# Patient Record
Sex: FEMALE | Race: WHITE | NOT HISPANIC OR LATINO | Employment: FULL TIME | URBAN - METROPOLITAN AREA
[De-identification: names, ages, dates, MRNs, and addresses within clinical notes are randomized per-mention and may not be internally consistent; named-entity substitution may affect disease eponyms.]

---

## 2017-02-09 ENCOUNTER — OFFICE VISIT - HEALTHEAST (OUTPATIENT)
Dept: FAMILY MEDICINE | Facility: CLINIC | Age: 47
End: 2017-02-09

## 2017-02-09 DIAGNOSIS — A04.72 C. DIFFICILE COLITIS: ICD-10-CM

## 2017-02-09 DIAGNOSIS — E83.51 HYPOCALCEMIA: ICD-10-CM

## 2017-02-09 DIAGNOSIS — K51.00 PANCOLITIS (H): ICD-10-CM

## 2017-02-09 ASSESSMENT — MIFFLIN-ST. JEOR: SCORE: 1039.06

## 2017-02-13 ENCOUNTER — COMMUNICATION - HEALTHEAST (OUTPATIENT)
Dept: FAMILY MEDICINE | Facility: CLINIC | Age: 47
End: 2017-02-13

## 2017-02-16 ENCOUNTER — RECORDS - HEALTHEAST (OUTPATIENT)
Dept: ADMINISTRATIVE | Facility: OTHER | Age: 47
End: 2017-02-16

## 2017-04-04 ENCOUNTER — AMBULATORY - HEALTHEAST (OUTPATIENT)
Dept: FAMILY MEDICINE | Facility: CLINIC | Age: 47
End: 2017-04-04

## 2017-04-04 ENCOUNTER — OFFICE VISIT - HEALTHEAST (OUTPATIENT)
Dept: FAMILY MEDICINE | Facility: CLINIC | Age: 47
End: 2017-04-04

## 2017-04-04 DIAGNOSIS — L84 PLANTAR CALLUS: ICD-10-CM

## 2017-04-04 DIAGNOSIS — R21 RASH: ICD-10-CM

## 2017-04-04 ASSESSMENT — MIFFLIN-ST. JEOR: SCORE: 1025.46

## 2018-02-14 ENCOUNTER — OFFICE VISIT - HEALTHEAST (OUTPATIENT)
Dept: FAMILY MEDICINE | Facility: CLINIC | Age: 48
End: 2018-02-14

## 2018-02-14 DIAGNOSIS — Z12.31 VISIT FOR SCREENING MAMMOGRAM: ICD-10-CM

## 2018-02-14 DIAGNOSIS — K40.90 LEFT INGUINAL HERNIA: ICD-10-CM

## 2018-02-14 ASSESSMENT — MIFFLIN-ST. JEOR: SCORE: 1016.38

## 2018-02-15 ENCOUNTER — HOSPITAL ENCOUNTER (OUTPATIENT)
Dept: ULTRASOUND IMAGING | Facility: CLINIC | Age: 48
Discharge: HOME | End: 2018-02-15
Attending: FAMILY MEDICINE

## 2018-02-15 DIAGNOSIS — K40.90 LEFT INGUINAL HERNIA: ICD-10-CM

## 2018-02-16 ENCOUNTER — COMMUNICATION - HEALTHEAST (OUTPATIENT)
Dept: FAMILY MEDICINE | Facility: CLINIC | Age: 48
End: 2018-02-16

## 2018-02-16 DIAGNOSIS — K40.90 NON-RECURRENT UNILATERAL INGUINAL HERNIA WITHOUT OBSTRUCTION OR GANGRENE: ICD-10-CM

## 2018-02-23 ENCOUNTER — OFFICE VISIT - HEALTHEAST (OUTPATIENT)
Dept: SURGERY | Facility: CLINIC | Age: 48
End: 2018-02-23

## 2018-02-23 DIAGNOSIS — K40.90 NON-RECURRENT UNILATERAL INGUINAL HERNIA WITHOUT OBSTRUCTION OR GANGRENE: ICD-10-CM

## 2018-02-23 ASSESSMENT — MIFFLIN-ST. JEOR: SCORE: 995.07

## 2018-03-06 ASSESSMENT — MIFFLIN-ST. JEOR: SCORE: 991.44

## 2018-03-07 ENCOUNTER — ANESTHESIA - HEALTHEAST (OUTPATIENT)
Dept: SURGERY | Facility: HOSPITAL | Age: 48
End: 2018-03-07

## 2018-03-08 ENCOUNTER — SURGERY - HEALTHEAST (OUTPATIENT)
Dept: SURGERY | Facility: HOSPITAL | Age: 48
End: 2018-03-08

## 2018-03-16 ENCOUNTER — OFFICE VISIT - HEALTHEAST (OUTPATIENT)
Dept: SURGERY | Facility: CLINIC | Age: 48
End: 2018-03-16

## 2018-03-16 ENCOUNTER — COMMUNICATION - HEALTHEAST (OUTPATIENT)
Dept: SURGERY | Facility: CLINIC | Age: 48
End: 2018-03-16

## 2018-03-16 DIAGNOSIS — Z98.890 POST-OPERATIVE STATE: ICD-10-CM

## 2018-03-23 ENCOUNTER — COMMUNICATION - HEALTHEAST (OUTPATIENT)
Dept: SURGERY | Facility: CLINIC | Age: 48
End: 2018-03-23

## 2018-03-29 ENCOUNTER — OFFICE VISIT - HEALTHEAST (OUTPATIENT)
Dept: FAMILY MEDICINE | Facility: CLINIC | Age: 48
End: 2018-03-29

## 2018-03-29 ENCOUNTER — RECORDS - HEALTHEAST (OUTPATIENT)
Dept: GENERAL RADIOLOGY | Facility: CLINIC | Age: 48
End: 2018-03-29

## 2018-03-29 DIAGNOSIS — M79.641 PAIN IN RIGHT HAND: ICD-10-CM

## 2018-03-29 DIAGNOSIS — M79.641 HAND PAIN, RIGHT: ICD-10-CM

## 2018-04-02 ENCOUNTER — RECORDS - HEALTHEAST (OUTPATIENT)
Dept: ADMINISTRATIVE | Facility: OTHER | Age: 48
End: 2018-04-02

## 2018-04-02 ENCOUNTER — COMMUNICATION - HEALTHEAST (OUTPATIENT)
Dept: FAMILY MEDICINE | Facility: CLINIC | Age: 48
End: 2018-04-02

## 2018-04-02 DIAGNOSIS — M25.539 WRIST PAIN: ICD-10-CM

## 2018-04-17 ENCOUNTER — RECORDS - HEALTHEAST (OUTPATIENT)
Dept: ADMINISTRATIVE | Facility: OTHER | Age: 48
End: 2018-04-17

## 2018-06-05 ENCOUNTER — RECORDS - HEALTHEAST (OUTPATIENT)
Dept: ADMINISTRATIVE | Facility: OTHER | Age: 48
End: 2018-06-05

## 2018-08-21 ENCOUNTER — RECORDS - HEALTHEAST (OUTPATIENT)
Dept: ADMINISTRATIVE | Facility: OTHER | Age: 48
End: 2018-08-21

## 2019-01-07 ENCOUNTER — RECORDS - HEALTHEAST (OUTPATIENT)
Dept: ADMINISTRATIVE | Facility: OTHER | Age: 49
End: 2019-01-07

## 2020-07-27 ENCOUNTER — OFFICE VISIT - HEALTHEAST (OUTPATIENT)
Dept: FAMILY MEDICINE | Facility: CLINIC | Age: 50
End: 2020-07-27

## 2020-07-27 DIAGNOSIS — H93.8X3 SENSATION OF FULLNESS IN BOTH EARS: ICD-10-CM

## 2020-07-27 DIAGNOSIS — S09.90XD TRAUMATIC INJURY OF HEAD, SUBSEQUENT ENCOUNTER: ICD-10-CM

## 2020-07-27 DIAGNOSIS — K13.0 LIP LESION: ICD-10-CM

## 2020-07-27 DIAGNOSIS — Z12.31 VISIT FOR SCREENING MAMMOGRAM: ICD-10-CM

## 2020-09-01 ENCOUNTER — COMMUNICATION - HEALTHEAST (OUTPATIENT)
Dept: TELEHEALTH | Facility: CLINIC | Age: 50
End: 2020-09-01

## 2020-09-01 ENCOUNTER — OFFICE VISIT - HEALTHEAST (OUTPATIENT)
Dept: AUDIOLOGY | Facility: CLINIC | Age: 50
End: 2020-09-01

## 2020-09-01 ENCOUNTER — OFFICE VISIT - HEALTHEAST (OUTPATIENT)
Dept: OTOLARYNGOLOGY | Facility: CLINIC | Age: 50
End: 2020-09-01

## 2020-09-01 DIAGNOSIS — H93.8X2 SENSATION OF FULLNESS IN LEFT EAR: ICD-10-CM

## 2020-09-01 DIAGNOSIS — K13.0 LIP LESION: ICD-10-CM

## 2020-09-01 DIAGNOSIS — H90.3 SENSORINEURAL HEARING LOSS, BILATERAL: ICD-10-CM

## 2020-09-01 DIAGNOSIS — J34.89 NASAL VESTIBULITIS: ICD-10-CM

## 2020-09-01 RX ORDER — CLOBETASOL PROPIONATE 0.05 MG/G
GEL TOPICAL
Status: SHIPPED | COMMUNITY
Start: 2020-01-02 | End: 2021-09-29

## 2020-09-01 RX ORDER — FLUTICASONE PROPIONATE 0.5 MG/ML
LOTION TOPICAL
Status: SHIPPED | COMMUNITY
Start: 2020-09-01 | End: 2021-09-29

## 2020-09-01 RX ORDER — MOMETASONE FUROATE 1 MG/G
CREAM TOPICAL
Status: SHIPPED | COMMUNITY
Start: 2019-12-31 | End: 2021-09-29

## 2020-09-01 RX ORDER — BETAMETHASONE DIPROPIONATE 0.5 MG/G
CREAM TOPICAL 2 TIMES DAILY
Status: SHIPPED | COMMUNITY
Start: 2020-09-01 | End: 2023-03-06

## 2020-09-04 LAB
LAB AP CHARGES (HE HISTORICAL CONVERSION): NORMAL
PATH REPORT.COMMENTS IMP SPEC: NORMAL
PATH REPORT.COMMENTS IMP SPEC: NORMAL
PATH REPORT.FINAL DX SPEC: NORMAL
PATH REPORT.GROSS SPEC: NORMAL
PATH REPORT.MICROSCOPIC SPEC OTHER STN: NORMAL
PATH REPORT.RELEVANT HX SPEC: NORMAL
RESULT FLAG (HE HISTORICAL CONVERSION): NORMAL

## 2020-09-10 ENCOUNTER — COMMUNICATION - HEALTHEAST (OUTPATIENT)
Dept: OTOLARYNGOLOGY | Facility: CLINIC | Age: 50
End: 2020-09-10

## 2020-09-11 ENCOUNTER — COMMUNICATION - HEALTHEAST (OUTPATIENT)
Dept: OTOLARYNGOLOGY | Facility: CLINIC | Age: 50
End: 2020-09-11

## 2020-09-16 ENCOUNTER — COMMUNICATION - HEALTHEAST (OUTPATIENT)
Dept: OTOLARYNGOLOGY | Facility: CLINIC | Age: 50
End: 2020-09-16

## 2021-05-30 VITALS — BODY MASS INDEX: 21.6 KG/M2 | WEIGHT: 110 LBS | HEIGHT: 60 IN

## 2021-05-30 VITALS — WEIGHT: 113 LBS | HEIGHT: 60 IN | BODY MASS INDEX: 22.19 KG/M2

## 2021-06-01 VITALS — HEIGHT: 59 IN | BODY MASS INDEX: 21.53 KG/M2 | WEIGHT: 106.8 LBS

## 2021-06-01 VITALS — HEIGHT: 60 IN | BODY MASS INDEX: 21.2 KG/M2 | WEIGHT: 108 LBS

## 2021-06-01 VITALS — HEIGHT: 59 IN | BODY MASS INDEX: 21.37 KG/M2 | WEIGHT: 106 LBS

## 2021-06-01 VITALS — BODY MASS INDEX: 22.48 KG/M2 | WEIGHT: 111.31 LBS

## 2021-06-04 VITALS
TEMPERATURE: 98.2 F | BODY MASS INDEX: 21.64 KG/M2 | WEIGHT: 110.8 LBS | HEART RATE: 66 BPM | OXYGEN SATURATION: 98 % | SYSTOLIC BLOOD PRESSURE: 122 MMHG | DIASTOLIC BLOOD PRESSURE: 82 MMHG

## 2021-06-08 NOTE — PROGRESS NOTES
Jessica is a 46 y.o. female presenting to the clinic for follow-up on hospitalization.  Patient has a history of aplastic anemia in childhood requiring 2 bone marrow transplants.  She has a history of mitral valve prolapse which she states is minor and did not require repair.  Patient was admitted to the hospital on 12/13/16 with abdominal pain and diarrhea.  CT of the abdomen revealed pancolitis.  Stool culture was positive for Clostridium difficile.  Patient was initially started on Flagyl IV and oral vancomycin.  When she was discharged, she was experiencing 8 bowel movements per day.  Patient's white blood count was 9.7. Her calcium was 8.3.   Patient was taking fluids without difficulty but continued to have a poor appetite.  She is to complete a two-week course of vancomycin.  Patient states her bowel movements are now occurring once daily.  She denies any blood or mucus in the stool.  She has not had any abdominal pain.  She has been taking omeprazole for GERD.  No fever has been present.  She has been eating and drinking well.  She is taking a probiotic daily.    CT ABDOMEN PELVIS WO ORAL W IV CONTRAST  12/7/2016 10:56 PM      INDICATION: Abdominal pain with fever and leukocytosis  TECHNIQUE: CT abdomen and pelvis. Multiplanar reformation images (MPR). Dose reduction techniques were used.   IV CONTRAST: Iohexol (Omni) 100 mL  COMPARISON: None.     FINDINGS:  LUNG BASES: Negative.     ABDOMEN: Moderate diffuse colonic wall thickening with mucosal hyperenhancement compatible with pancolitis. Cyst or hemangioma caudate of the liver. Liver is otherwise negative. Spleen, pancreas, adrenal glands are normal. Small renal cysts.     PELVIS: Exophytic fundal uterine fibroid measuring 2.1 cm. Colonic wall thickening.     MUSCULOSKELETAL: Negative.     IMPRESSION:   CONCLUSION:  1. Changes of pancolitis with moderate diffuse colonic wall thickening and mucosal hyperenhancement. Minimal pericolonic soft tissue  haziness.     2. Bilateral renal cysts     3. Fibroid     4. Cyst or hemangioma base of the caudate liver.     NOTE: ABNORMAL REPORT     THE DICTATION ABOVE DESCRIBES AN ABNORMALITY FOR WHICH FOLLOW-UP IS NEEDED.     Review of Systems: A complete 14 point review of systems was obtained and is negative or as stated in the history of present illness.    Social History     Social History     Marital status:      Spouse name: N/A     Number of children: N/A     Years of education: N/A     Occupational History     Not on file.     Social History Main Topics     Smoking status: Never Smoker     Smokeless tobacco: Never Used     Alcohol use 3.6 oz/week     6 Cans of beer per week     Drug use: No     Sexual activity: Not Currently      Comment:      Other Topics Concern     Not on file     Social History Narrative       Active Ambulatory Problems     Diagnosis Date Noted     Pancolitis 12/07/2016     Immunosuppressed status      Diarrhea      Generalized abdominal pain      C. difficile colitis      Mitral valve prolapse 02/09/2017     Aplastic anemia 02/09/2017     Resolved Ambulatory Problems     Diagnosis Date Noted     No Resolved Ambulatory Problems     Past Medical History:   Diagnosis Date     Anemia        Family History   Problem Relation Age of Onset     Hypertension Mother      Hyperlipidemia Mother      Asthma Mother      Breast cancer Maternal Aunt      Lung cancer Maternal Uncle        OBJECTIVE:     Visit Vitals     /60 (Patient Site: Right Arm, Patient Position: Sitting, Cuff Size: Adult Regular)     Pulse 61     Temp 97.5  F (36.4  C)     Ht 5' (1.524 m)     Wt 113 lb (51.3 kg)     SpO2 97%     BMI 22.07 kg/m2       Patient is alert, in no obvious distress.   Skin: Warm, dry.  No lesions or rashes.  Skin turgor rapid return.   HEENT:  Head normocephalic, atraumatic.  Eyes normal.  Ears normal.  Nose patent, mucosa pink.  Oropharynx mucosa pink.  No lesions or tonsillar enlargement.    Neck: Supple, no lymphadenopathy.  Lungs:  Clear to auscultation. Respirations even and unlabored.  No wheezing or rales noted.   Heart:  Regular rate and rhythm.  No murmurs, S3, S4, gallops, or rubs.    Abdomen: Soft, nontender.  No organomegaly. Bowel sounds normoactive. No guarding or masses noted.       ASSESSMENT AND PLAN:     1. Pancolitis     2. C. difficile colitis     3. Hypocalcemia  Basic Metabolic Panel     Patient continues to take vancomycin.  She also plans on continuing the probiotic.  Bowel movements have improved and she is eating and drinking well.  We'll recheck BMP due to hypocalcemia.  We'll [facility].  She will follow up as needed.  Her medications were reconciled this visit.

## 2021-06-09 NOTE — PROGRESS NOTES
Jessica is a 46 y.o. female presenting to the clinic for concerns for possible warts present on her feet.  Patient states she noticed a wart present on her left foot fourth toe 2 months ago.  Patient states similar wart appeared on her right foot in the same area in her fourth toe one month ago.  Patient states it is painful when she wear shoes.  She denies any drainage from the area.  She has not noticed any redness or swelling.  She tried an over-the-counter wart remover with no relief.  Patient is also concerned of a rash present on her elbows and face.  She has had this intermittently for multiple years. She states it is occasionally pruritic.  It tends to flare with stressful events.  She has tried over-the-counter cortisone cream and moisturizing lotions.    Review of Systems: A complete 14 point review of systems was obtained and is negative or as stated in the history of present illness.    Social History     Social History     Marital status:      Spouse name: N/A     Number of children: N/A     Years of education: N/A     Occupational History     Not on file.     Social History Main Topics     Smoking status: Never Smoker     Smokeless tobacco: Never Used     Alcohol use 3.6 oz/week     6 Cans of beer per week     Drug use: No     Sexual activity: Not Currently      Comment:      Other Topics Concern     Not on file     Social History Narrative       Active Ambulatory Problems     Diagnosis Date Noted     Pancolitis 12/07/2016     Immunosuppressed status      Diarrhea      Generalized abdominal pain      C. difficile colitis      Mitral valve prolapse 02/09/2017     Aplastic anemia 02/09/2017     Resolved Ambulatory Problems     Diagnosis Date Noted     No Resolved Ambulatory Problems     Past Medical History:   Diagnosis Date     Anemia        Family History   Problem Relation Age of Onset     Hypertension Mother      Hyperlipidemia Mother      Asthma Mother      Breast cancer Maternal Aunt       Lung cancer Maternal Uncle        OBJECTIVE:     /68 (Patient Site: Left Arm, Patient Position: Sitting, Cuff Size: Adult Regular)  Pulse 67  Ht 5' (1.524 m)  Wt 110 lb (49.9 kg)  SpO2 97%  BMI 21.48 kg/m2    Patient is alert, in no obvious distress.   Skin: Warm, dry.  She has a dry scaly rash noted on both elbows.    Lungs:  Clear to auscultation. Respirations even and unlabored.  No wheezing or rales noted.   Heart:  Regular rate and rhythm.  No murmurs.   Musculoskeletal: She has full range of motion of both feet.  Her fourth and fifth toes appear to rub together and make contact at all times.  She has small calluses on the side of the fourth toe where the fifth toe is rubbing the area.    ASSESSMENT AND PLAN:     1. Rash  Differentials include psoriasis and eczema.  Will treat with triamcinolone cream as needed.  No longer than 2 weeks.  Will refer to dermatology for further evaluation.  - triamcinolone (KENALOG) 0.1 % cream; Apply to the affected area twice daily as needed  Dispense: 45 g; Refill: 2  - Ambulatory referral to Dermatology    2. Plantar callus   Recommend using a pumice to assist with the callus.  Did offer referral to podiatry for further evaluation, but she declines.  Discussed symptomatic treatment including placing a barrier between the 2 toes.  Patient will consider this and follow-up if symptoms persist or worsen.
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
## 2021-06-11 NOTE — TELEPHONE ENCOUNTER
----- Message from Fermin JOYCE MD sent at 9/10/2020 11:46 AM CDT -----  I left message on patient's answering service with the result of her biopsy. I advised that she call and let us know if she would like us to make a referral to dermatology.  ----- Message -----  From: Lab, Background User  Sent: 9/4/2020   4:04 PM CDT  To: Fermin JOYCE MD

## 2021-06-11 NOTE — TELEPHONE ENCOUNTER
Left patient a voicemail to call back regarding results.    Krista Munson RN  [facility]  [phone]

## 2021-06-11 NOTE — PROGRESS NOTES
HISTORY OF PRESENT ILLNESS  Asked to see by Jo-Ann Roth for evaluation lip lesion and hearing loss. Patient reports a lesion on the lower lip x 6 months. She feels that it is getting bigger. No bleeding or drainage. It is hard. Her entire bottom lip is red and rashy. It is very irritated. It peels at times. She has a sore in the left corner of the mouth. She also reports hearing loss. She reports history of noise exposure. She reports that her mother is deaf. Her sister is hard of hearing.     REVIEW OF SYSTEMS  Review of Systems: a 10-system review was performed. Pertinent positives are noted in the HPI and on a separate scanned document in the chart.    PMH, PSH, FH and SH has documented in the EHR.      EXAM    CONSTITUTIONAL  General Appearance:  Normal, well developed, well nourished, no obvious distress  Ability to Communicate:  communicates appropriately.    HEAD AND FACE  Appearance and Symmetry:  Normal, no scalp or facial scarring or suspicious lesions.    EARS  Clinical speech reception threshold:  Normal, able to hear normal speech.  Auricle:  Normal, Auricles without scars, lesions, masses.  External auditory canal:  Normal, External auditory canal normal.  Tympanic membrane:  Normal, Tympanic membranes normal without swelling or erythema.    NOSE (speculum or scope)  Architecture:  Normal, Grossly normal external nasal architecture with no masses or lesions.  Mucosa:  Normal mucosa, No polyps or masses.  Septum:  Crust left septum with erosion of mucosa.  Turbinates:  Normal, No turbinate abnormalities    ORAL CAVITY AND OROPHARYNX  Lips:  Small area of leukoplakia right lower lip.  Dental and gingiva:  Normal, No obvious dental or gingival disease.  Mucosa:  Normal, Moist mucous membranes.  Tongue:  Normal, Tongue mobile with no mucosal abnormalities  Hard and soft palate:  Normal, Hard and soft palate without cleft or mucosal lesions.  Oral pharynx:  Normal, Posterior pharynx without lesions or  remarkable asymmetry.  Saliva:  Normal, Clear saliva.  Masses:  Normal, No palpable masses or pathologically enlarged lymph nodes.    NECK  Masses/lymph nodes:  Normal, No worrisome neck masses or lymph nodes.  Salivary glands:  Normal, Parotid and submandibular glands.  Trachea and larynx position:  Normal, Trachea and larynx midline.  Thyroid:  Normal, No thyroid abnormality.  Tenderness:  Normal, No cervical tenderness.  Suppleness:  Normal, Neck supple    NEUROLOGICAL  Speech pattern:  Normal, Proasaic    RESPIRATORY  Symmetry and Respiratory effort:  Normal, Symmetric chest movement and expansion with no increased intercostal retractions or use of accessory muscles.     HEARING TEST  Results of hearing test as documented in audiology notes which were reviewed.    IMPRESSION  1. Lip lesion  2. Bilateral sensorineural hearing loss  3. Nasal vestibulitis    RECOMMENDATION  1. Discussed biopsy and the patient wanted to proceed. The area was infiltrated with lidocaine plus epinephrine solution. After allowing adequate time for anesthesia a 3mm punch biopsy was used to incision the area. The specimen was removed with scissors and placed in formalin.   2. Discussed hearing aid evaluation. Patient is going to think about her options.   3. Mupirocin to left nose x 2 weeks.    Fermin Salcedo MD

## 2021-06-11 NOTE — TELEPHONE ENCOUNTER
Left message on answering service regarding biopsy findings. I recommended referral to Dermatology. I asked that she call us to confirm receipt of the biopsy results and recommendation for referral.

## 2021-06-16 NOTE — ANESTHESIA CARE TRANSFER NOTE
Last vitals:   Vitals:    03/08/18 1100   BP: 143/78   Pulse: 90   Resp: 12   Temp: 36.7  C (98  F)   SpO2: 100%     Patient's level of consciousness is drowsy  Spontaneous respirations: yes  Maintains airway independently: yes  Dentition unchanged: yes  Oropharynx: oropharynx clear of all foreign objects    QCDR Measures:  ASA# 20 - Surgical Safety Checklist: WHO surgical safety checklist completed prior to induction  PQRS# 430 - Adult PONV Prevention: 4558F - Pt received => 2 anti-emetic agents (different classes) preop & intraop  ASA# 8 - Peds PONV Prevention: NA - Not pediatric patient, not GA or 2 or more risk factors NOT present  PQRS# 424 - Isabela-op Temp Management: 4559F - At least one body temp DOCUMENTED => 35.5C or 95.9F within required timeframe  PQRS# 426 - PACU Transfer Protocol: - Transfer of care checklist used  ASA# 14 - Acute Post-op Pain: ASA14B - Patient did NOT experience pain >= 7 out of 10

## 2021-06-16 NOTE — PROGRESS NOTES
Assessment/Plan:     Patient presents with a small bulge in the left inguinal area and significant pain, worsened with standing and moving as well as lifting.  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  Ultrasound ordered to evaluate presence of hernia.  If it is present, will refer to general surgery for evaluation of surgical resolution.  If it is not a hernia, discussed with patient that the differential is fairly broad or that she could have hernia but it might not correlate to her pain symptoms.  If patient has any worsening symptoms she will present immediately to the emergency department such as signs of infection or worsening pain.    Problem List Items Addressed This Visit     None      Visit Diagnoses     Left inguinal hernia    -  Primary    Relevant Orders    US Groin Non Vascular Left    Visit for screening mammogram                Total time spent with patient was 15 minutes with greater than 50% spent in face-to-face counseling regarding the above plan.    This note has been dictated using voice recognition software. Any grammatical or context distortions are unintentional and inherent to the the software.     Talia Hayward MD  [facility]      Subjective:      Jessica Galvan is a 47 y.o. female who presents to clinic for possible hernia.    Location/radiation: left lower quadrant, left pelvis  Quality: cramping, feels tight  Severity: 10/10 at its worst, 5/10 now  Length of time since symptoms began: October or November, 4-5 months ago  Frequency, episode duration, regularity: flares last one half day or a few hours or even a day; today's episode has been going for over a day; 10 flares in the past 5 months  Triggers or situations in which it occurs: worse with moving around and standing, worsened with stretching for things above her head  Modifying factors: feels better when she leans forward, has  not taken any medication for it previously  Associated signs and sx: no changes in bowel habits but is intermittently constipated    Patient states they have never experienced this type of pain before. Patient has had c diff before and states her 'stomach has never felt the same' after that. She also fell in September and thinks this pain might be related.      Past Medical History, Family History, and Social History reviewed.     Review of systems is as stated in HPI.  Patient endorses: weakness, fatigue, dizziness, nausea, joint pain, joint swelling, easy bruising  The remainder of the 10 system review is otherwise negative.    Objective:     /84  Pulse 84  Ht 5' (1.524 m)  Wt 108 lb (49 kg)  SpO2 99%  BMI 21.09 kg/m2 Body mass index is 21.09 kg/(m^2).    Gen: Alert, NAD, appears stated age, normal hygiene   ABD: non-tender to palpation, nondistended  MSK: grossly full range of motion in all joints, no obvious deformity; 1 cm mass palpated easily in the left inguinal area which worsened slightly with Valsalva, no tenderness in the suprapubic area      Current Outpatient Prescriptions on File Prior to Visit   Medication Sig Dispense Refill     ascorbic acid, vitamin C, (ASCORBIC ACID WITH KUSHAL HIPS) 500 MG tablet Take 1,000 mg by mouth daily.       cholecalciferol, vitamin D3, 1,000 unit tablet Take 2,000 Units by mouth daily.       Lactobacillus rhamnosus GG (CULTURELLE) 10-15 Billion cell capsule Take 1 capsule by mouth 3 (three) times a day with meals. 60 capsule 0     multivitamin therapeutic (THERAGRAN) tablet Take 1 tablet by mouth daily.       omeprazole (PRILOSEC) 20 MG capsule Take 20 mg by mouth every other day.       potassium gluconate 595 mg (99 mg) Tab Take 1 tablet by mouth daily.       pyridoxine, vitamin B6, (B-6) 50 MG tablet Take 100 mg by mouth daily.       triamcinolone (KENALOG) 0.1 % cream Apply to the affected area twice daily as needed 45 g 2     [DISCONTINUED] vancomycin  (VANCOCIN) 125 mg/2.5 mL Syrg solution Take 2.5 mL (125 mg total) by mouth 4 (four) times a day. 140 mL 0     No current facility-administered medications on file prior to visit.

## 2021-06-16 NOTE — ANESTHESIA POSTPROCEDURE EVALUATION
Patient: Jessica Galvan  ROBOTIC LEFT INGUINAL HERNIA REPAIR  Anesthesia type: general    Patient location: PACU  Last vitals:   Vitals:    03/08/18 1400   BP: 108/55   Pulse: (!) 47   Resp: 16   Temp:    SpO2: 99%     Post vital signs: stable  Level of consciousness: awake and responds to simple questions  Post-anesthesia pain: pain controlled  Post-anesthesia nausea and vomiting: no  Pulmonary: unassisted, return to baseline  Cardiovascular: stable and blood pressure at baseline  Hydration: adequate  Anesthetic events: no    QCDR Measures:  ASA# 11 - Isabela-op Cardiac Arrest: ASA11B - Patient did NOT experience unanticipated cardiac arrest  ASA# 12 - Isabela-op Mortality Rate: ASA12B - Patient did NOT die  ASA# 13 - PACU Re-Intubation Rate: ASA13B - Patient did NOT require a new airway mgmt  ASA# 10 - Composite Anes Safety: ASA10A - No serious adverse event    Additional Notes:

## 2021-06-16 NOTE — PROGRESS NOTES
Small left inguinal bulge worse with heavy lifting, tender to palpation, palpable bulge plan for robotic left possible right    HPI:  Jessica Galvan is a 47 y.o. female who was referred to me by No Primary Care Provider for an inguinal hernia. She  presents today with complaints of a left inguinal bulge.  She notices this is worse with heavy lifting and states the bulge is progressively enlarging.  He denies any right-sided bulges but would like to address her left inguinal region.  She did have an ultrasound of left inguinal region recently which demonstrated a fat-containing inguinal hernia.    Allergies:Prozac [fluoxetine] and Norflex [orphenadrine citrate]    Past Medical History:   Diagnosis Date     Anemia        Past Surgical History:   Procedure Laterality Date     APPENDECTOMY       BONE MARROW TRANSPLANT         CURRENT MEDS:  Current Outpatient Prescriptions   Medication Sig Dispense Refill     ascorbic acid, vitamin C, (ASCORBIC ACID WITH KUSHAL HIPS) 500 MG tablet Take 1,000 mg by mouth daily.       cholecalciferol, vitamin D3, 1,000 unit tablet Take 2,000 Units by mouth daily.       Lactobacillus rhamnosus GG (CULTURELLE) 10-15 Billion cell capsule Take 1 capsule by mouth 3 (three) times a day with meals. 60 capsule 0     multivitamin therapeutic (THERAGRAN) tablet Take 1 tablet by mouth daily.       omeprazole (PRILOSEC) 20 MG capsule Take 20 mg by mouth every other day.       potassium gluconate 595 mg (99 mg) Tab Take 1 tablet by mouth daily.       pyridoxine, vitamin B6, (B-6) 50 MG tablet Take 100 mg by mouth daily.       triamcinolone (KENALOG) 0.1 % cream Apply to the affected area twice daily as needed 45 g 2     No current facility-administered medications for this visit.        Family History   Problem Relation Age of Onset     Hypertension Mother      Hyperlipidemia Mother      Asthma Mother      Breast cancer Maternal Aunt      Lung cancer Maternal Uncle         reports that she has never  "smoked. She has never used smokeless tobacco. She reports that she drinks about 3.6 oz of alcohol per week  She reports that she does not use illicit drugs.    Review of Systems -   The 12 system review is within normal limits except for as mentioned above.  General ROS: No complaints or constitutional symptoms  Ophthalmic ROS: No complaints of visual changes  Skin: No complaints or symptoms   Endocrine: No complaints or symptoms  Hematologic/Lymphatic: No symptoms or complaints  Psychiatric: No symptoms or complaints  Respiratory ROS: no cough, shortness of breath, or wheezing  Cardiovascular ROS: no chest pain or dyspnea on exertion  Gastrointestinal ROS: As per HPI  Genito-Urinary ROS: no dysuria, trouble voiding, or hematuria  Musculoskeletal ROS: no joint or muscle pain  Neurological ROS: no TIA or stroke symptoms    BP (!) 143/93 (Patient Site: Right Arm, Patient Position: Sitting, Cuff Size: Adult Regular)  Pulse 67  Ht 4' 11\" (1.499 m)  Wt 106 lb 12.8 oz (48.4 kg)  SpO2 97%  Breastfeeding? No  BMI 21.57 kg/m2  Body mass index is 21.57 kg/(m^2).    EXAM:  GENERAL: Well developed female, No acute distress, pleasant and conversant   EYES: Pupils equal, round and reactive, no scleral icterus  CARDIAC: Regular rate and rhythm, no obvious murmurs noted  CHEST/LUNG: Clear to ascultation bilaterally, No ronchi, No wheezes  ABDOMEN: Soft, nontender, palpable left inguinal bulge with Valsalva and cough  SKIN: Pink, warm and dry, no obvious rashes or lesions   NEURO:No focal deficits, ambulatory  MUSCULOSKELETAL:No obvious deformities, no swelling, normal appearing          IMAGES:   Relevant images were reviewed and discussed with the patient.  Notable findings were   US GROIN NON VASCULAR LEFT  2/15/2018 4:21 PM     INDICATION: hernia?  COMPARISON: CT abdomen and pelvis 12/07/2016.     FINDINGS: Targeted grayscale and Doppler ultrasound evaluation was performed of the left inguinal region at the site of " palpable concern. With Valsalva maneuver, a small amount of tissue with echotexture similar to subcutaneous fat extends through the   left inguinal canal. The findings are consistent with a small fat-containing hernia.     IMPRESSION:   CONCLUSION:   Findings consistent with a small fat-containing left inguinal hernia. This could be confirmed by noncontrast CT as necessary.      Assessment/Plan:   Jessica Galvan is a 47 y.o. female with a left inguinal hernia.  I have discussed the pathophysiology of inguinal hernias at length as well as the  surgical and non-operative management strategies.      The risks of Robotic, Laparoscopic and open inguinal hernia  surgery were explained in detail which include, but are not limited to, bleeding, infection of the mesh, recurrence of the hernia, chronic pain, poor cosmesis, the need for reoperative intervention, the possibility of conversion from a laparoscopic approach to an open approach, subcutaneous emphysema, injury to vital structures,  blood clots, heart attack, stroke and death.  Additionally, the risks of observation were also discussed in detail which include, but are not limited to, chronic pain, enlargement of the hernia, incarceration, strangulation and death.      She understands everything that was discussed and has consented to proceed with surgery.   We will plan on scheduling a robotic left and possible right inguinal hernia repair at her desired date.       Bro Nazario D.O. FACS  [phone]  [facility] Department of Surgery

## 2021-06-16 NOTE — PROGRESS NOTES
"HPI: Pt is here for follow up of a robotic left inguinal hernia repair.   She was initially doing well the first 3 days post op but then she feels that she \"over did it' at the grocery store and carrying to much up to her house and has persistent pain since then.  She has not tried tylenol/ibuprofen of ice to help relieve the pain.     She is eating and having regular bowel movement.   No difficulties with the surgical incisions.  Denies fever/chills.       BP (!) 163/96 (Patient Site: Right Arm, Patient Position: Sitting, Cuff Size: Adult Regular)  Pulse 70  SpO2 99%  Breastfeeding? No    EXAM:  GENERAL:Appears well, emotional as she just heard of her uncle passing away  ABDOMEN:  Soft, +BS, some ecchymosis in LLQ, incisions well healed, slight ttp left groin - no bulge or mass noted.  SURGICAL WOUNDS:  Incisions healing well, no enduration or drainage.      Assessment/Plan: .     Patient having some prolonged pain likely related to increasing her activity and lifting too much so soon post op.  I discussed the case with Dr. Nazario and we recommended tylenol, ibuprofen and ice over the next 1-2 weeks and expect that her pain will improve.    If her pain worsens or continues to persist she was instructed to come back in for an appointment with Dr. Nazario.       Hesham Holder, [facility] Department of Surgery    "

## 2021-06-16 NOTE — ANESTHESIA PREPROCEDURE EVALUATION
Anesthesia Evaluation      Patient summary reviewed     Airway   Mallampati: II  Neck ROM: full   Pulmonary - negative ROS and normal exam                          Cardiovascular - normal exam  (+) valvular problems/murmurs MVP, ,      Neuro/Psych - negative ROS     Endo/Other - negative ROS      GI/Hepatic/Renal - negative ROS     Comments: Hx C. 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

## 2021-06-17 NOTE — PROGRESS NOTES
ASSESSMENT/PLAN:    Hand pain, right  47 y.o. Female presented with 3 weeks of pain along the base of her thumb (metacarpal-carpal).  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  Xray was unremarkable.  I advised rest but she is certainly she cannot take any more time off from work due to being back from sick leave for hernia surgery.  I advised wearing a wrist brace with a thumb splint and keep her hands elevated above her waist.  She may take OTC analgesics for pain management.  F/u with PCP in 2-4 wks if still without improvement  -     XR Hand Right 3 or More VWS; Future; Expected date: 3/29/18    Orders Placed This Encounter   Procedures     XR Hand Right 3 or More VWS     Standing Status:   Future     Number of Occurrences:   1     Standing Expiration Date:   3/29/2019     Order Specific Question:   Is the patient pregnant?     Answer:   No     Order Specific Question:   Can the procedure be changed per Radiologist protocol?     Answer:   Yes         CHIEF COMPLAINT:  Chief Complaint   Patient presents with     Hand Pain     right hand pain/swollen x weeks, no injury related, thinks it is from work     FYI     had surgery repair x 3 weeks ago       HISTORY OF PRESENT ILLNESS:  Jessica is a 47 y.o. female presenting to the clinic today for hand pain. She has had pain in her right hand and thumb for a few weeks. She does note that 3 weeks ago, she had an abdominal hernia repair. The hand started to have pain during the week of surgery. The right thumb is swelling for her. She feels most pain at the CMC join of the right thumb. She has pain with certain range of motion, and with pinching things. She can have some pain to the touch. She went back to work 3 days ago post surgery, and this is when her hand started to get worse. She is left hand dominant. She does a lot of repetitive hand use at work. She rates the  pain at a 7-8/10, and was a 10/10 at work. She does note that she jammed her right hand at work today. She has been taking ibuprofen 600mg 1-2 times daily for her pain.     REVIEW OF SYSTEMS:   Constitutional: Negative.   HENT: Negative.   Eyes: Negative.   Respiratory: Negative.   Cardiovascular: Negative.   Gastrointestinal: Negative.   Endocrine: Negative.   Genitourinary: Negative.   Skin: Negative.   Allergic/Immunologic: Negative.   Neurological: Negative.   Hematological: Negative.   Psychiatric/Behavioral: Negative.   All other systems are negative.    PFSH:  Not a smoker.   Family history of osteoporosis and osteoarthritis.      TOBACCO USE:  History   Smoking Status     Never Smoker   Smokeless Tobacco     Never Used       VITALS:  Vitals:    03/29/18 1609   BP: 132/80   Pulse: 76   Weight: 111 lb 5 oz (50.5 kg)     Wt Readings from Last 3 Encounters:   03/29/18 111 lb 5 oz (50.5 kg)   03/06/18 106 lb (48.1 kg)   02/23/18 106 lb 12.8 oz (48.4 kg)       PHYSICAL EXAM:  Constitutional: Patient is oriented to person, place, and time. Patient appears well-developed and well-nourished. No distress.   Neurological: Patient is alert and oriented to person, place, and time.   Skin: Skin is warm and dry. No rash noted. Patient is not diaphoretic. No erythema. No pallor.  Musculoskeletal: There is some mild swelling, no erythema. There is tenderness to palpation to the right CMC joint. Radial pulse is strong.     Results for orders placed or performed during the hospital encounter of 03/08/18   Pregnancy, urine   Result Value Ref Range    Pregnancy Test, Urine Negative Negative    Specific Gravity, UA 1.019 1.001 - 1.030   Hemogram 2   Result Value Ref Range    WBC 5.8 4.0 - 11.0 thou/uL    RBC 3.64 (L) 3.80 - 5.40 mill/uL    Hemoglobin 12.5 12.0 - 16.0 g/dL    Hematocrit 37.6 35.0 - 47.0 %     (H) 80 - 100 fL    MCH 34.3 (H) 27.0 - 34.0 pg    MCHC 33.2 32.0 - 36.0 g/dL    RDW 12.6 11.0 - 14.5 %    Platelets  189 140 - 440 thou/uL    MPV 9.8 8.5 - 12.5 fL           ADDITIONAL HISTORY SUMMARIZED (2): None.  DECISION TO OBTAIN EXTRA INFORMATION (1): None.   RADIOLOGY TESTS (1): Ordered hand XR today.  LABS (1): None.  MEDICINE TESTS (1): None.  INDEPENDENT REVIEW (2 each): reviewed and interpreted xray.     ITaina, am scribing for and in the presence of, Dr. Stinson.    Maureen IVAN MD , personally performed the services described in this documentation, as scribed by Taina Jon in my presence, and it is both accurate and complete.    MEDICATIONS:  Current Outpatient Prescriptions   Medication Sig Dispense Refill     ascorbic acid, vitamin C, (ASCORBIC ACID WITH KUSHAL HIPS) 500 MG tablet Take 1,000 mg by mouth daily.       cholecalciferol, vitamin D3, 1,000 unit tablet Take 2,000 Units by mouth daily.       Lactobacillus rhamnosus GG (CULTURELLE) 10-15 Billion cell capsule Take 1 capsule by mouth daily.       lifitegrast (XIIDRA) 5 % Dpet Apply 1 drop to eye at bedtime.       multivitamin therapeutic (THERAGRAN) tablet Take 1 tablet by mouth daily.       omeprazole (PRILOSEC) 20 MG capsule Take 20 mg by mouth every other day.       polyvinyl alcohol (LIQUIFILM TEARS) 1.4 % ophthalmic solution Apply 1 drop to eye as needed for dry eyes.       potassium gluconate 595 mg (99 mg) Tab Take 1 tablet by mouth daily.       pyridoxine, vitamin B6, (B-6) 50 MG tablet Take 100 mg by mouth daily.       tacrolimus (PROTOPIC) 0.1 % ointment   4     No current facility-administered medications for this visit.        Total data points: 3

## 2021-06-29 NOTE — PROGRESS NOTES
"Progress Notes by Giselle Pandya AuD at 9/1/2020 12:40 PM     Author: Giselle Pandya AuD Service: -- Author Type: Audiologist    Filed: 9/1/2020  1:21 PM Encounter Date: 9/1/2020 Status: Signed    : Giselle Pandya AuD (Audiologist)       Hearing evaluation in conjunction with ENT exam (Dr. Salcedo)    Summary:  Audiology visit completed. Please see audiogram below or under \"media\" tab for history and results.    Transducer:  Both insert phones and circumaural headphones were used.    Reliability:    Good    Recommendations:  Follow-up with ENT; retest hearing annually (to monitor) or per medical management/patient concern.  Wear hearing protection consistently in noise to preserve residual hearing sensitivity.  Jessica Galvan is a potential binaural amplification candidate, if patient motivation exists and medical clearance is granted.    Ling Berger, [facility]  [place] Licensed Audiologist [id number]           "

## 2021-07-11 ENCOUNTER — HEALTH MAINTENANCE LETTER (OUTPATIENT)
Age: 51
End: 2021-07-11

## 2021-09-05 ENCOUNTER — HEALTH MAINTENANCE LETTER (OUTPATIENT)
Age: 51
End: 2021-09-05

## 2021-09-27 PROBLEM — D84.9 IMMUNOSUPPRESSED STATUS (H): Status: ACTIVE | Noted: 2021-09-27

## 2021-09-27 PROBLEM — R10.84 GENERALIZED ABDOMINAL PAIN: Status: ACTIVE | Noted: 2021-09-27

## 2021-09-27 PROBLEM — K40.90 NON-RECURRENT UNILATERAL INGUINAL HERNIA WITHOUT OBSTRUCTION OR GANGRENE: Status: ACTIVE | Noted: 2021-09-27

## 2021-09-27 PROBLEM — I34.1 MITRAL VALVE PROLAPSE: Status: ACTIVE | Noted: 2017-02-09

## 2021-09-27 PROBLEM — A04.72 C. DIFFICILE COLITIS: Status: ACTIVE | Noted: 2021-09-27

## 2021-09-27 PROBLEM — R19.7 DIARRHEA: Status: ACTIVE | Noted: 2021-09-27

## 2021-09-29 ENCOUNTER — OFFICE VISIT (OUTPATIENT)
Dept: FAMILY MEDICINE | Facility: CLINIC | Age: 51
End: 2021-09-29
Payer: COMMERCIAL

## 2021-09-29 VITALS
HEART RATE: 77 BPM | WEIGHT: 106.5 LBS | DIASTOLIC BLOOD PRESSURE: 82 MMHG | OXYGEN SATURATION: 100 % | BODY MASS INDEX: 21.47 KG/M2 | HEIGHT: 59 IN | TEMPERATURE: 97.1 F | SYSTOLIC BLOOD PRESSURE: 120 MMHG

## 2021-09-29 DIAGNOSIS — N95.2 VAGINAL ATROPHY: ICD-10-CM

## 2021-09-29 DIAGNOSIS — I34.1 MITRAL VALVE PROLAPSE: ICD-10-CM

## 2021-09-29 DIAGNOSIS — Z12.31 ENCOUNTER FOR SCREENING MAMMOGRAM FOR BREAST CANCER: ICD-10-CM

## 2021-09-29 DIAGNOSIS — E83.42 HYPOMAGNESEMIA: ICD-10-CM

## 2021-09-29 DIAGNOSIS — Z11.3 ROUTINE SCREENING FOR STI (SEXUALLY TRANSMITTED INFECTION): ICD-10-CM

## 2021-09-29 DIAGNOSIS — K21.9 GASTROESOPHAGEAL REFLUX DISEASE WITHOUT ESOPHAGITIS: ICD-10-CM

## 2021-09-29 DIAGNOSIS — Z78.0 MENOPAUSE: ICD-10-CM

## 2021-09-29 DIAGNOSIS — D61.9 APLASTIC ANEMIA (H): ICD-10-CM

## 2021-09-29 DIAGNOSIS — Z11.4 SCREENING FOR HIV (HUMAN IMMUNODEFICIENCY VIRUS): ICD-10-CM

## 2021-09-29 DIAGNOSIS — K43.9 VENTRAL HERNIA WITHOUT OBSTRUCTION OR GANGRENE: ICD-10-CM

## 2021-09-29 DIAGNOSIS — Z11.59 ENCOUNTER FOR HEPATITIS C SCREENING TEST FOR LOW RISK PATIENT: ICD-10-CM

## 2021-09-29 DIAGNOSIS — Z12.4 SCREENING FOR CERVICAL CANCER: ICD-10-CM

## 2021-09-29 DIAGNOSIS — A04.72 C. DIFFICILE COLITIS: Primary | ICD-10-CM

## 2021-09-29 DIAGNOSIS — L20.82 FLEXURAL ECZEMA: ICD-10-CM

## 2021-09-29 DIAGNOSIS — Z00.00 ANNUAL PHYSICAL EXAM: ICD-10-CM

## 2021-09-29 PROBLEM — D84.9 IMMUNOSUPPRESSED STATUS (H): Status: RESOLVED | Noted: 2021-09-27 | Resolved: 2021-09-29

## 2021-09-29 PROBLEM — R10.84 GENERALIZED ABDOMINAL PAIN: Status: RESOLVED | Noted: 2021-09-27 | Resolved: 2021-09-29

## 2021-09-29 PROBLEM — R19.7 DIARRHEA: Status: RESOLVED | Noted: 2021-09-27 | Resolved: 2021-09-29

## 2021-09-29 LAB
ALBUMIN SERPL-MCNC: 4.4 G/DL (ref 3.5–5)
ALP SERPL-CCNC: 109 U/L (ref 45–120)
ALT SERPL W P-5'-P-CCNC: 21 U/L (ref 0–45)
ANION GAP SERPL CALCULATED.3IONS-SCNC: 15 MMOL/L (ref 5–18)
AST SERPL W P-5'-P-CCNC: 25 U/L (ref 0–40)
BILIRUB SERPL-MCNC: 0.3 MG/DL (ref 0–1)
BUN SERPL-MCNC: 11 MG/DL (ref 8–22)
CALCIUM SERPL-MCNC: 10 MG/DL (ref 8.5–10.5)
CHLORIDE BLD-SCNC: 103 MMOL/L (ref 98–107)
CHOLEST SERPL-MCNC: 221 MG/DL
CO2 SERPL-SCNC: 23 MMOL/L (ref 22–31)
CREAT SERPL-MCNC: 0.64 MG/DL (ref 0.6–1.1)
ERYTHROCYTE [DISTWIDTH] IN BLOOD BY AUTOMATED COUNT: 12.5 % (ref 10–15)
FASTING STATUS PATIENT QL REPORTED: ABNORMAL
GFR SERPL CREATININE-BSD FRML MDRD: >90 ML/MIN/1.73M2
GLUCOSE BLD-MCNC: 84 MG/DL (ref 70–125)
HCT VFR BLD AUTO: 40.7 % (ref 35–47)
HDLC SERPL-MCNC: 99 MG/DL
HGB BLD-MCNC: 13.9 G/DL (ref 11.7–15.7)
HIV 1+2 AB+HIV1 P24 AG SERPL QL IA: NEGATIVE
LDLC SERPL CALC-MCNC: 114 MG/DL
MAGNESIUM SERPL-MCNC: 2.1 MG/DL (ref 1.8–2.6)
MCH RBC QN AUTO: 34.7 PG (ref 26.5–33)
MCHC RBC AUTO-ENTMCNC: 34.2 G/DL (ref 31.5–36.5)
MCV RBC AUTO: 102 FL (ref 78–100)
PLATELET # BLD AUTO: 255 10E3/UL (ref 150–450)
POTASSIUM BLD-SCNC: 4.3 MMOL/L (ref 3.5–5)
PROT SERPL-MCNC: 7.4 G/DL (ref 6–8)
RBC # BLD AUTO: 4.01 10E6/UL (ref 3.8–5.2)
SODIUM SERPL-SCNC: 141 MMOL/L (ref 136–145)
TRIGL SERPL-MCNC: 41 MG/DL
WBC # BLD AUTO: 5.7 10E3/UL (ref 4–11)

## 2021-09-29 PROCEDURE — 80053 COMPREHEN METABOLIC PANEL: CPT | Performed by: STUDENT IN AN ORGANIZED HEALTH CARE EDUCATION/TRAINING PROGRAM

## 2021-09-29 PROCEDURE — 87591 N.GONORRHOEAE DNA AMP PROB: CPT | Performed by: STUDENT IN AN ORGANIZED HEALTH CARE EDUCATION/TRAINING PROGRAM

## 2021-09-29 PROCEDURE — 36415 COLL VENOUS BLD VENIPUNCTURE: CPT | Performed by: STUDENT IN AN ORGANIZED HEALTH CARE EDUCATION/TRAINING PROGRAM

## 2021-09-29 PROCEDURE — 86780 TREPONEMA PALLIDUM: CPT | Performed by: STUDENT IN AN ORGANIZED HEALTH CARE EDUCATION/TRAINING PROGRAM

## 2021-09-29 PROCEDURE — 86803 HEPATITIS C AB TEST: CPT | Performed by: STUDENT IN AN ORGANIZED HEALTH CARE EDUCATION/TRAINING PROGRAM

## 2021-09-29 PROCEDURE — 85027 COMPLETE CBC AUTOMATED: CPT | Performed by: STUDENT IN AN ORGANIZED HEALTH CARE EDUCATION/TRAINING PROGRAM

## 2021-09-29 PROCEDURE — 87389 HIV-1 AG W/HIV-1&-2 AB AG IA: CPT | Performed by: STUDENT IN AN ORGANIZED HEALTH CARE EDUCATION/TRAINING PROGRAM

## 2021-09-29 PROCEDURE — 87491 CHLMYD TRACH DNA AMP PROBE: CPT | Performed by: STUDENT IN AN ORGANIZED HEALTH CARE EDUCATION/TRAINING PROGRAM

## 2021-09-29 PROCEDURE — 80061 LIPID PANEL: CPT | Performed by: STUDENT IN AN ORGANIZED HEALTH CARE EDUCATION/TRAINING PROGRAM

## 2021-09-29 PROCEDURE — 99396 PREV VISIT EST AGE 40-64: CPT | Performed by: STUDENT IN AN ORGANIZED HEALTH CARE EDUCATION/TRAINING PROGRAM

## 2021-09-29 PROCEDURE — 88142 CYTOPATH C/V THIN LAYER: CPT | Performed by: STUDENT IN AN ORGANIZED HEALTH CARE EDUCATION/TRAINING PROGRAM

## 2021-09-29 PROCEDURE — 87624 HPV HI-RISK TYP POOLED RSLT: CPT | Performed by: STUDENT IN AN ORGANIZED HEALTH CARE EDUCATION/TRAINING PROGRAM

## 2021-09-29 PROCEDURE — 99214 OFFICE O/P EST MOD 30 MIN: CPT | Mod: 25 | Performed by: STUDENT IN AN ORGANIZED HEALTH CARE EDUCATION/TRAINING PROGRAM

## 2021-09-29 PROCEDURE — 83735 ASSAY OF MAGNESIUM: CPT | Performed by: STUDENT IN AN ORGANIZED HEALTH CARE EDUCATION/TRAINING PROGRAM

## 2021-09-29 RX ORDER — ESTRADIOL 0.1 MG/G
CREAM VAGINAL
Qty: 20 G | Refills: 0 | Status: SHIPPED | OUTPATIENT
Start: 2021-09-29 | End: 2021-10-27

## 2021-09-29 RX ORDER — DESONIDE 0.5 MG/G
CREAM TOPICAL
COMMUNITY
Start: 2021-05-25 | End: 2023-03-06

## 2021-09-29 ASSESSMENT — PATIENT HEALTH QUESTIONNAIRE - PHQ9: SUM OF ALL RESPONSES TO PHQ QUESTIONS 1-9: 7

## 2021-09-29 ASSESSMENT — MIFFLIN-ST. JEOR: SCORE: 993.71

## 2021-09-29 NOTE — PROGRESS NOTES
Assessment/ Plan     51-year-old female with history of aplastic anemia, GERD, hypomagnesemia previous abdominal hernia who presented for establishment of care and annual physical exam.  She had a couple of concerns today including dizziness over the last couple years but worsened since Covid infection last winter.  Some chest pain as well that has worsened in the last 2 months.  Dizziness seems most consistent with vertigo and discussed Epley maneuvers but forgot to give her these before she left.  Felt that would be difficult for her to use meclizine as they do not last long enough.  Chest pain we decided to do watchful waiting for now as atypical for cardiac etiology no concerning findings on exam.    1. Flexural eczema  Patient has a history of rash over her elbows and knees.  On my exam today seems most consistent with eczema.  Psoriasis possible given location. she has several topical steroid creams prescribed by her dermatologist to treat this.    2. Gastroesophageal reflux disease without esophagitis  Well controlled with omeprazole    3. Hypomagnesemia  Previously told she had low magnesium.  Unclear when this was.  Has been taking magnesium supplementation ever since.  - Magnesium; Future  - Magnesium    4. C. difficile colitis    5. Screening for cervical cancer  - Pap screen with HPV - recommended age 30 - 65 years    6. Annual physical exam  - Lipid panel reflex to direct LDL Fasting; Future  - Comprehensive metabolic panel (BMP + Alb, Alk Phos, ALT, AST, Total. Bili, TP); Future  - CBC with platelets; Future  - CBC with platelets  - Comprehensive metabolic panel (BMP + Alb, Alk Phos, ALT, AST, Total. Bili, TP)  - Lipid panel reflex to direct LDL Fasting    7. Screening for HIV (human immunodeficiency virus)  - HIV Antigen Antibody Combo; Future  - HIV Antigen Antibody Combo    8. Encounter for hepatitis C screening test for low risk patient  - Hepatitis C antibody; Future  - Hepatitis C antibody    9.  Routine screening for STI (sexually transmitted infection)  Patient requesting STI screening as she has a new sexual partner.  - Neisseria gonorrhoeae PCR; Future  - Chlamydia trachomatis PCR; Future  - Treponema Abs w Reflex to RPR and Titer; Future  - Treponema Abs w Reflex to RPR and Titer  - Neisseria gonorrhoeae PCR  - Chlamydia trachomatis PCR    10. Menopause  - *MA Screening Digital Bilateral; Future    11. Encounter for screening mammogram for breast cancer  - Adult Gastro Ref - Procedure Only; Future    12. Vaginal atrophy  Patient found to have vaginal atrophy on exam today during Pap smear.  Is having symptoms of dryness and pain with intercourse.  Will place her on Estrace cream to see if these improve symptoms.  - estradiol (ESTRACE) 0.1 MG/GM vaginal cream; Place 1 g vaginally daily for 14 days, THEN 1 g three times a week for 14 days.  Dispense: 20 g; Refill: 0      I spent greater than 50% of hour visit counseling patient on symptoms and work-up of these.  Discussed symptomatic management and counseled on return precautions.    Jerome Rivera MD    Subjective:     Jessica Galvan is a 51 year old female who presents for an annual exam.     Chief Complaint   Patient presents with     Physical     needs mammo and pap smear. having dizzy spells (not sure related to covid or not). having headaches on and off. heart issues. has mitrol valve prolapse.        Answers for HPI/ROS submitted by the patient on 9/29/2021  Frequency of exercise:: 2-3 days/week  Getting at least 3 servings of Calcium per day:: NO  Diet:: Regular (no restrictions)  Taking medications regularly:: No  Medication side effects:: None  Bi-annual eye exam:: Yes  Dental care twice a year:: Yes  Sleep apnea or symptoms of sleep apnea:: None  Additional concerns today:: Yes  Duration of exercise:: 30-45 minutes    Colonoscopy: Never  Dexa: Never  Mammogram: > 3 years  Pap smear: > 5 years.  Unknown results    Patient states that she had  COVID in January. This was confirmed via a test. She did not need to be admited during this time.  She was having significant dizzy spells with this.  This s better but seems persistent. Having a couple episodes as least a day now. THe dizziness she describes as a falling sensation especially. Seems to worsen wih had movmeent. She had some of these before COVID but not as severe.     Chest pain:  She notes she has a mumur and mitral valve prolapse. She has been having chest pain since having COVID. She describes these as episodes as about a couple minutes and seem to be under her left breast. She describes the pain as squeezing pain. No worse with acitivity. Some family Hx of heart disease in uncles. No young. Does not smoke. No diabetes. No high bllood pressure.      ROS is positive for occasional skipped beats, constipation, abdominal hernia, vision changes, decreased hearing    Immunization History   Administered Date(s) Administered     DTaP, Unspecified 12/22/2005, 01/20/2014     Immunization status: due today.     Current Outpatient Medications   Medication Sig Dispense Refill     ascorbic acid, vitamin C, (ASCORBIC ACID WITH KUSHAL HIPS) 500 MG tablet [ASCORBIC ACID, VITAMIN C, (ASCORBIC ACID WITH KUSHAL HIPS) 500 MG TABLET] Take 1,000 mg by mouth daily.       betamethasone dipropionate (DIPROLENE) 0.05 % cream [BETAMETHASONE DIPROPIONATE (DIPROLENE) 0.05 % CREAM] Apply topically 2 (two) times a day.       cholecalciferol, vitamin D3, 1,000 unit tablet [CHOLECALCIFEROL, VITAMIN D3, 1,000 UNIT TABLET] Take 2,000 Units by mouth daily.       desonide (DESOWEN) 0.05 % external cream        Lactobacillus rhamnosus GG (CULTURELLE) 10-15 Billion cell capsule [LACTOBACILLUS RHAMNOSUS GG (CULTURELLE) 10-15 BILLION CELL CAPSULE] Take 1 capsule by mouth daily.       magnesium oxide 250 mg magnesium Tab [MAGNESIUM OXIDE 250 MG MAGNESIUM TAB] Take by mouth.       multivitamin therapeutic (THERAGRAN) tablet [MULTIVITAMIN  THERAPEUTIC (THERAGRAN) TABLET] Take 1 tablet by mouth daily.       omeprazole (PRILOSEC) 20 MG capsule [OMEPRAZOLE (PRILOSEC) 20 MG CAPSULE] Take 20 mg by mouth every other day.       potassium gluconate 595 mg (99 mg) Tab [POTASSIUM GLUCONATE 595 MG (99 MG) TAB] Take 1 tablet by mouth daily.       pyridoxine, vitamin B6, (B-6) 50 MG tablet [PYRIDOXINE, VITAMIN B6, (B-6) 50 MG TABLET] Take 100 mg by mouth daily.       clobetasoL (TEMOVATE) 0.05 % Gel [CLOBETASOL (TEMOVATE) 0.05 % GEL] APPLY TOPICALLY TWICE DAILY X3 WEEKS. TAKE A 1 WEEK BREAK AND REPEAT (Patient not taking: Reported on 9/29/2021)       fluticasone propionate 0.05 % Lotn [FLUTICASONE PROPIONATE 0.05 % LOTN] Apply topically. (Patient not taking: Reported on 9/29/2021)       mometasone (ELOCON) 0.1 % cream [MOMETASONE (ELOCON) 0.1 % CREAM] APPLY TWICE A DAY TOPICALLY X 5 DAYS AS NEEDED TO FACE. (Patient not taking: Reported on 9/29/2021)       polyvinyl alcohol (LIQUIFILM TEARS) 1.4 % ophthalmic solution [POLYVINYL ALCOHOL (LIQUIFILM TEARS) 1.4 % OPHTHALMIC SOLUTION] Apply 1 drop to eye as needed for dry eyes. (Patient not taking: Reported on 9/29/2021)       Past Medical History:   Diagnosis Date     Anemia      History of aplastic anemia      Mitral valve prolapse      Past Surgical History:   Procedure Laterality Date     APPENDECTOMY       TRANSPLANT       Fluoxetine and Norflex [orphenadrine]  Family History   Problem Relation Age of Onset     Hypertension Mother      Hyperlipidemia Mother      Asthma Mother      Breast Cancer Maternal Aunt      Lung Cancer Maternal Uncle      Social History     Socioeconomic History     Marital status:      Spouse name: Not on file     Number of children: Not on file     Years of education: Not on file     Highest education level: Not on file   Occupational History     Not on file   Tobacco Use     Smoking status: Never Smoker     Smokeless tobacco: Never Used   Substance and Sexual Activity     Alcohol  "use: Yes     Alcohol/week: 6.0 standard drinks     Drug use: No     Sexual activity: Not Currently     Comment:    Other Topics Concern     Not on file   Social History Narrative     Not on file     Social Determinants of Health     Financial Resource Strain:      Difficulty of Paying Living Expenses:    Food Insecurity:      Worried About Running Out of Food in the Last Year:      Ran Out of Food in the Last Year:    Transportation Needs:      Lack of Transportation (Medical):      Lack of Transportation (Non-Medical):    Physical Activity:      Days of Exercise per Week:      Minutes of Exercise per Session:    Stress:      Feeling of Stress :    Social Connections:      Frequency of Communication with Friends and Family:      Frequency of Social Gatherings with Friends and Family:      Attends Rastafari Services:      Active Member of Clubs or Organizations:      Attends Club or Organization Meetings:      Marital Status:    Intimate Partner Violence:      Fear of Current or Ex-Partner:      Emotionally Abused:      Physically Abused:      Sexually Abused:        Review of Systems  Complete ROS negative except as noted in the HPI    Objective:      Vitals:    09/29/21 1200   BP: 120/82   BP Location: Right arm   Patient Position: Sitting   Cuff Size: Adult Regular   Pulse: 77   Temp: 97.1  F (36.2  C)   TempSrc: Temporal   SpO2: 100%   Weight: 48.3 kg (106 lb 8 oz)   Height: 1.499 m (4' 11\")       General appearance: Alert, cooperative, no distress, appears stated age  Head: Normocephalic, atraumatic, without obvious abnormality  EARS: TM's gray dull with structures seen bilaterally  Eyes: Pupils equal round, reactive.  Conjunctiva clear.  Nose: Nares normal, no drainage.  Throat: Lips, mucosa, tongue normal mucosa pink and moist  Neck: Supple, symmetric, trachea midline, no adenopathy.  No thyroid enlargement, tenderness or nodules.   Lungs: Clear to auscultation bilaterally, no wheezing or crackles " present.  Respirations unlabored  Heart: Regular rate and rhythm, normal S1 and S2, no murmur, rub or gallop.  Abdomen: Soft, mildly tender over small ventral hernia just left of umbillicus, nondistended.  Bowel sounds active in all 4 quadrants.  No masses or organomegaly.  Extremities: Extremities normal, atraumatic.  No cyanosis or edema.  Skin: red xerotic appearing plque like lesions over her elbows and knees. No silvery component.  Neurologic: CN II through XII intact, normal strength.  Uro/Gyn:  Urethral meatus is normal size, location, with no large lesions or prolapse noted  Vagina with decreased rugae and thinning of the skin noted, no discharge, large lesions, rectocele, or cystocele  Cervix is normal in appearance without lesions or discharge noted, no cervical motion tenderness  Uterus is anteverted with normal size and no large masses, no tenderness noted on palpation        Jerome Mukherjee MD

## 2021-09-30 ENCOUNTER — TELEPHONE (OUTPATIENT)
Dept: FAMILY MEDICINE | Facility: CLINIC | Age: 51
End: 2021-09-30

## 2021-09-30 LAB
HCV AB SERPL QL IA: NEGATIVE
T PALLIDUM AB SER QL: NEGATIVE

## 2021-09-30 NOTE — TELEPHONE ENCOUNTER
Faxed over paperwork for reimbursement to pt that has last physical/vitals. Faxed to [facility] at [phone]

## 2021-10-01 DIAGNOSIS — A74.9 CHLAMYDIA INFECTION: Primary | ICD-10-CM

## 2021-10-01 LAB
C TRACH DNA SPEC QL NAA+PROBE: POSITIVE
HUMAN PAPILLOMA VIRUS 16 DNA: NEGATIVE
HUMAN PAPILLOMA VIRUS 18 DNA: NEGATIVE
HUMAN PAPILLOMA VIRUS FINAL DIAGNOSIS: NORMAL
HUMAN PAPILLOMA VIRUS OTHER HR: NEGATIVE
N GONORRHOEA DNA SPEC QL NAA+PROBE: NEGATIVE

## 2021-10-01 RX ORDER — DOXYCYCLINE 100 MG/1
100 CAPSULE ORAL 2 TIMES DAILY
Qty: 14 CAPSULE | Refills: 0 | Status: SHIPPED | OUTPATIENT
Start: 2021-10-01 | End: 2021-10-08

## 2021-10-01 NOTE — RESULT ENCOUNTER NOTE
I called and spoke with the patient about their recent clinic visit results. I answered any questions they had. I recommended doxycycline BID for 7 days and placed order for chlamydia treatment. She will also inform her sexual partner.       Dr. Jerome Rivera

## 2021-10-04 LAB
BKR LAB AP GYN ADEQUACY: NORMAL
BKR LAB AP GYN INTERPRETATION: NORMAL
BKR LAB AP HPV REFLEX: NORMAL
BKR LAB AP PREVIOUS ABNORMAL: NORMAL
PATH REPORT.COMMENTS IMP SPEC: NORMAL
PATH REPORT.RELEVANT HX SPEC: NORMAL

## 2021-10-05 PROBLEM — Z12.4 SCREENING FOR CERVICAL CANCER: Status: ACTIVE | Noted: 2021-10-05

## 2021-10-14 ENCOUNTER — HOSPITAL ENCOUNTER (OUTPATIENT)
Dept: MAMMOGRAPHY | Facility: CLINIC | Age: 51
Discharge: HOME | End: 2021-10-14
Attending: STUDENT IN AN ORGANIZED HEALTH CARE EDUCATION/TRAINING PROGRAM | Admitting: STUDENT IN AN ORGANIZED HEALTH CARE EDUCATION/TRAINING PROGRAM
Payer: COMMERCIAL

## 2021-10-14 DIAGNOSIS — Z78.0 MENOPAUSE: ICD-10-CM

## 2021-10-14 PROCEDURE — 77067 SCR MAMMO BI INCL CAD: CPT

## 2022-02-08 ENCOUNTER — NURSE TRIAGE (OUTPATIENT)
Dept: NURSING | Facility: CLINIC | Age: 52
End: 2022-02-08
Payer: COMMERCIAL

## 2022-02-08 NOTE — TELEPHONE ENCOUNTER
Jessica is calling and states that she has swelling under left eye. Face is swelling. Left eye is swollen and red and warm to touch.  Symptoms started yesterday during the afternoon started with eye getting sore.  Patient denies fever cough and shortness of breath.  Patient denies allergic reaction.  Patient did state that she changed her furnace filter.  FNA advised to go to walk in clinic if PCP has no openings and patient agrees.    COVID 19 Nurse Triage Plan/Patient Instructions    Please be aware that novel coronavirus (COVID-19) may be circulating in the community. If you develop symptoms such as fever, cough, or SOB or if you have concerns about the presence of another infection including coronavirus (COVID-19), please contact your health care provider or visit [email or web address].     Disposition/Instructions    In-Person Visit with provider recommended. Reference Visit Selection Guide.    Thank you for taking steps to prevent the spread of this virus.  o Limit your contact with others.  o Wear a simple mask to cover your cough.  o Wash your hands well and often.    Resources    [facility]: About COVID-19: [email or web address]    CDC: What to Do If You're Sick: www.cdc.gov/coronavirus/2019-ncov/about/steps-when-sick.html    CDC: Ending Home Isolation: www.cdc.gov/coronavirus/2019-ncov/hcp/disposition-in-home-patients.html     CDC: Caring for Someone: www.cdc.gov/coronavirus/2019-ncov/if-you-are-sick/care-for-someone.html     [facility]: Interim Guidance for Hospital Discharge to Home: [email or web address]/coronavirus/hcp/hospdischarge.pdf    [facility] clinical trials (COVID-19 research studies): [email or web address]     Below are the COVID-19 hotlines at the [place] Department of Health ([facility]). Interpreters are available.   o For health questions: Call 204-045-8740 or 1-467.914.3443 (7 a.m. to 7 p.m.)  o For questions about schools and  "childcare: Call 647-319-1369 or 1-965.867.1688 (7 a.m. to 7 p.m.)                       Reason for Disposition    [1] SEVERE eyelid swelling (i.e., shut or almost) AND [2] involves both eyes AND [3] itchy    Additional Information    Negative: Unresponsive, passed out or very weak    Negative: Difficulty breathing or wheezing    Negative: [1] Difficulty swallowing or slurred speech AND [2] sudden onset    Negative: Sounds like a life-threatening emergency to the triager    Negative: Patient sounds very sick or weak to the triager    Negative: [1] SEVERE eyelid swelling (i.e., shut or almost) AND [2] fever    Negative: [1] Eyelid (outer) is very red AND [2] fever    Negative: [1] Pregnant > 20 weeks AND [2] sudden weight gain (i.e., more than 3 lbs or 1.4 kg in one week)    Negative: [1] SEVERE eyelid swelling (i.e., shut or almost) AND [2] involves both eyes      (Exception: itchy eyes, which  are probably an allergic reaction)    Negative: [1] SEVERE eyelid swelling (i.e., shut or almost) AND [2] Taking an ACE Inhibitor medication (e.g., benazepril/LOTENSIN, captopril/CAPOTEN, enalapril/VASOTEC, lisinopril/ZESTRIL)    Negative: [1] SEVERE eyelid swelling on one side AND [2] red and painful (or tender to touch)    Negative: [1] SEVERE eyelid swelling on one side AND [2] sinus pain or pressure    Negative: [1] MILD swelling AND [2] fever    Negative: [1] Painful rash AND [2] multiple small blisters grouped together (i.e., dermatomal distribution or \"band\" or \"stripe\")    Protocols used: EYE - SWELLING-A-AH      "

## 2022-02-09 ENCOUNTER — OFFICE VISIT (OUTPATIENT)
Dept: FAMILY MEDICINE | Facility: CLINIC | Age: 52
End: 2022-02-09
Payer: COMMERCIAL

## 2022-02-09 VITALS
DIASTOLIC BLOOD PRESSURE: 76 MMHG | HEART RATE: 79 BPM | OXYGEN SATURATION: 92 % | BODY MASS INDEX: 21.31 KG/M2 | WEIGHT: 105.5 LBS | SYSTOLIC BLOOD PRESSURE: 118 MMHG

## 2022-02-09 DIAGNOSIS — L03.213 PRESEPTAL CELLULITIS: ICD-10-CM

## 2022-02-09 DIAGNOSIS — L57.0 SOLAR KERATOSIS: ICD-10-CM

## 2022-02-09 DIAGNOSIS — R53.83 OTHER FATIGUE: Primary | ICD-10-CM

## 2022-02-09 DIAGNOSIS — F41.1 GAD (GENERALIZED ANXIETY DISORDER): ICD-10-CM

## 2022-02-09 PROCEDURE — 99214 OFFICE O/P EST MOD 30 MIN: CPT | Performed by: STUDENT IN AN ORGANIZED HEALTH CARE EDUCATION/TRAINING PROGRAM

## 2022-02-09 RX ORDER — FLUOROURACIL 50 MG/G
CREAM TOPICAL 2 TIMES DAILY
Qty: 40 G | Refills: 1 | Status: SHIPPED | OUTPATIENT
Start: 2022-02-09 | End: 2023-03-06

## 2022-02-09 NOTE — PROGRESS NOTES
Assessment and Plan     51-year-old female with past medical history of aplastic anemia, GERD, C. difficile, mitral valve prolapse, generalizing Enmanuel disorder who presents for concern of redness and swelling below her left eye that I think was most likely preseptal cellulitis.  However patient recovering on her own decided to just watch and wait.  Did offer antibiotic printed prescription in case it worsens again but she refused.  We considered other etiologies which I think are unlikely.  Is having a lot of fatigue and considered obtaining thyroid studies but ultimately decided against this.  I do think patient has significant amount of stress and anxiety that contribute to her various symptoms.  I recommended she consider antidepressants in the future and she will.  Finally placed an order for fluorouracil to trial for solar lentigo and solar keratosis around her eyes.    1. Other fatigue    2. Solar keratosis  - fluorouracil (EFUDEX) 5 % external cream; Apply topically 2 times daily  Dispense: 40 g; Refill: 1    3. NESTOR (generalized anxiety disorder)    4. Preseptal cellulitis    Follow up: PRN  Options for treatment and follow-up care were reviewed with the patient and/or guardian. Jessica Galvan and/or guardian engaged in the decision making process and verbalized understanding of the options discussed and agreed with the final plan.    Dr. Jerome Rivera         HPI:   Jessica Galvan is a 51 year old  female who presents for:    Chief Complaint   Patient presents with     Eye Problem     left eye - it was hot w/rash like / real puffy     Patient tells me that over the last couple of days she started noticing puffy redness below her left eye and somewhat around the top.  She notes her eye felt irritated but she did not have any vision problems during this time.  Did not have any changes to the cornea.  No discharge.  She did feel like the skin around her eye was warm and thought of it as possibly a  cellulitis.  However since she made the appointment she notes that overall her symptoms have improved and notices a small area of redness below her left eye.  Wondering if she should be concerned about this or do anything.    She also brings up that she has been quite stressed lately and not sleeping well.  Having headaches frequently.  She wishes to quit her job and find a new one as she does not feel satisfied by the work and feels overworked and stressed.  She does have a history of anxiety and depression and was on medications at one time.  Previously failed Prozac due to an allergy with hives.  Was on Effexor for some time but does not really know if this truly helped.  She eventually stopped on her own as she does not like to take medicines.  She does have significant insomnia associated with this.    Finally patient tells me she has an eye cream prescribed by her dermatologist that she has been using for couple months but does not know exactly what for.  From description seems to be related to mild skin changes around her eye such as from solar lentigo.  Discussed that she was previously on a a cream that worked better and started with an F.  Believe it was fluorouracil.  Wondering if I can prescribe this.         PMHX:     Patient Active Problem List   Diagnosis     Aplastic anemia (H)     C. difficile colitis     Mitral valve prolapse     Ventral hernia without obstruction or gangrene     Flexural eczema     Gastroesophageal reflux disease without esophagitis     Hypomagnesemia     Screening for cervical cancer       Current Outpatient Medications   Medication Sig Dispense Refill     ascorbic acid, vitamin C, (ASCORBIC ACID WITH KUSHAL HIPS) 500 MG tablet [ASCORBIC ACID, VITAMIN C, (ASCORBIC ACID WITH KUSHAL HIPS) 500 MG TABLET] Take 1,000 mg by mouth daily.       betamethasone dipropionate (DIPROLENE) 0.05 % cream [BETAMETHASONE DIPROPIONATE (DIPROLENE) 0.05 % CREAM] Apply topically 2 (two) times a day.        cholecalciferol, vitamin D3, 1,000 unit tablet [CHOLECALCIFEROL, VITAMIN D3, 1,000 UNIT TABLET] Take 2,000 Units by mouth daily.       Lactobacillus rhamnosus GG (CULTURELLE) 10-15 Billion cell capsule [LACTOBACILLUS RHAMNOSUS GG (CULTURELLE) 10-15 BILLION CELL CAPSULE] Take 1 capsule by mouth daily.       magnesium oxide 250 mg magnesium Tab [MAGNESIUM OXIDE 250 MG MAGNESIUM TAB] Take by mouth.       multivitamin therapeutic (THERAGRAN) tablet [MULTIVITAMIN THERAPEUTIC (THERAGRAN) TABLET] Take 1 tablet by mouth daily.       omeprazole (PRILOSEC) 20 MG capsule [OMEPRAZOLE (PRILOSEC) 20 MG CAPSULE] Take 20 mg by mouth every other day.       potassium gluconate 595 mg (99 mg) Tab [POTASSIUM GLUCONATE 595 MG (99 MG) TAB] Take 1 tablet by mouth daily.       pyridoxine, vitamin B6, (B-6) 50 MG tablet [PYRIDOXINE, VITAMIN B6, (B-6) 50 MG TABLET] Take 100 mg by mouth daily.       desonide (DESOWEN) 0.05 % external cream          Social History     Tobacco Use     Smoking status: Never Smoker     Smokeless tobacco: Never Used   Substance Use Topics     Alcohol use: Yes     Alcohol/week: 4.0 standard drinks     Types: 4 Cans of beer per week     Drug use: No       Social History     Social History Narrative     Not on file       Allergies   Allergen Reactions     Fluoxetine Hives and Rash     Norflex [Orphenadrine] Palpitations       No results found for this or any previous visit (from the past 24 hour(s)).         Review of Systems:    ROS: 10 point ROS neg other than the symptoms noted above in the HPI.         Physical Exam:     Vitals:    02/09/22 1439   BP: 118/76   Pulse: 79   SpO2: 92%   Weight: 47.9 kg (105 lb 8 oz)     Body mass index is 21.31 kg/m .    General appearance: Alert, cooperative, no distress, appears stated age  Head: Normocephalic, atraumatic, without obvious abnormality  Eyes: Pupils equal round, reactive.  Conjunctiva clear. No discharge.  Mild erythema and selling over cheek below left eye. No open  skin or drainage.  Nose: Nares normal, no drainage.  Throat: Lips, mucosa, tongue normal mucosa pink and moist  Neck: Supple, symmetric, trachea midline  Extremities: Extremities normal, atraumatic.  No cyanosis or edema.  Skin: Some xerotic flaky changes periorbitally  Neurologic: CN II through XII intact, normal strength.

## 2022-10-29 ENCOUNTER — HEALTH MAINTENANCE LETTER (OUTPATIENT)
Age: 52
End: 2022-10-29

## 2023-03-06 ENCOUNTER — HOSPITAL ENCOUNTER (OUTPATIENT)
Dept: MAMMOGRAPHY | Facility: CLINIC | Age: 53
Discharge: HOME | End: 2023-03-06
Admitting: STUDENT IN AN ORGANIZED HEALTH CARE EDUCATION/TRAINING PROGRAM
Payer: COMMERCIAL

## 2023-03-06 ENCOUNTER — OFFICE VISIT (OUTPATIENT)
Dept: FAMILY MEDICINE | Facility: CLINIC | Age: 53
End: 2023-03-06
Payer: COMMERCIAL

## 2023-03-06 VITALS
HEART RATE: 69 BPM | HEIGHT: 60 IN | DIASTOLIC BLOOD PRESSURE: 70 MMHG | BODY MASS INDEX: 22.19 KG/M2 | OXYGEN SATURATION: 97 % | TEMPERATURE: 97.1 F | SYSTOLIC BLOOD PRESSURE: 130 MMHG | WEIGHT: 113 LBS

## 2023-03-06 DIAGNOSIS — Z12.31 VISIT FOR SCREENING MAMMOGRAM: ICD-10-CM

## 2023-03-06 DIAGNOSIS — L57.0 SOLAR KERATOSIS: ICD-10-CM

## 2023-03-06 DIAGNOSIS — R07.9 CHEST PAIN, UNSPECIFIED TYPE: ICD-10-CM

## 2023-03-06 DIAGNOSIS — Z00.00 ANNUAL PHYSICAL EXAM: ICD-10-CM

## 2023-03-06 DIAGNOSIS — F40.240 CLAUSTROPHOBIA: ICD-10-CM

## 2023-03-06 DIAGNOSIS — D61.9 APLASTIC ANEMIA (H): ICD-10-CM

## 2023-03-06 DIAGNOSIS — R42 DIZZINESS: ICD-10-CM

## 2023-03-06 DIAGNOSIS — Z12.11 SCREEN FOR COLON CANCER: Primary | ICD-10-CM

## 2023-03-06 DIAGNOSIS — L30.9 DERMATITIS: ICD-10-CM

## 2023-03-06 LAB
ALBUMIN SERPL BCG-MCNC: 4.7 G/DL (ref 3.5–5.2)
ALP SERPL-CCNC: 123 U/L (ref 35–104)
ALT SERPL W P-5'-P-CCNC: 33 U/L (ref 10–35)
ANION GAP SERPL CALCULATED.3IONS-SCNC: 13 MMOL/L (ref 7–15)
AST SERPL W P-5'-P-CCNC: 38 U/L (ref 10–35)
BILIRUB SERPL-MCNC: 0.2 MG/DL
BUN SERPL-MCNC: 18.2 MG/DL (ref 6–20)
CALCIUM SERPL-MCNC: 10.3 MG/DL (ref 8.6–10)
CHLORIDE SERPL-SCNC: 105 MMOL/L (ref 98–107)
CHOLEST SERPL-MCNC: 235 MG/DL
CREAT SERPL-MCNC: 0.58 MG/DL (ref 0.51–0.95)
DEPRECATED HCO3 PLAS-SCNC: 25 MMOL/L (ref 22–29)
ERYTHROCYTE [DISTWIDTH] IN BLOOD BY AUTOMATED COUNT: 12.6 % (ref 10–15)
GFR SERPL CREATININE-BSD FRML MDRD: >90 ML/MIN/1.73M2
GLUCOSE SERPL-MCNC: 105 MG/DL (ref 70–99)
HCT VFR BLD AUTO: 40.2 % (ref 35–47)
HDLC SERPL-MCNC: 117 MG/DL
HGB BLD-MCNC: 13.5 G/DL (ref 11.7–15.7)
LDLC SERPL CALC-MCNC: 109 MG/DL
MCH RBC QN AUTO: 33.8 PG (ref 26.5–33)
MCHC RBC AUTO-ENTMCNC: 33.6 G/DL (ref 31.5–36.5)
MCV RBC AUTO: 101 FL (ref 78–100)
NONHDLC SERPL-MCNC: 118 MG/DL
PLATELET # BLD AUTO: 243 10E3/UL (ref 150–450)
POTASSIUM SERPL-SCNC: 4.1 MMOL/L (ref 3.4–5.3)
PROT SERPL-MCNC: 7.5 G/DL (ref 6.4–8.3)
RBC # BLD AUTO: 3.99 10E6/UL (ref 3.8–5.2)
SODIUM SERPL-SCNC: 143 MMOL/L (ref 136–145)
TRIGL SERPL-MCNC: 46 MG/DL
TROPONIN T SERPL HS-MCNC: 7 NG/L
WBC # BLD AUTO: 7 10E3/UL (ref 4–11)

## 2023-03-06 PROCEDURE — 77067 SCR MAMMO BI INCL CAD: CPT

## 2023-03-06 PROCEDURE — 99214 OFFICE O/P EST MOD 30 MIN: CPT | Mod: 25 | Performed by: STUDENT IN AN ORGANIZED HEALTH CARE EDUCATION/TRAINING PROGRAM

## 2023-03-06 PROCEDURE — 36415 COLL VENOUS BLD VENIPUNCTURE: CPT | Performed by: STUDENT IN AN ORGANIZED HEALTH CARE EDUCATION/TRAINING PROGRAM

## 2023-03-06 PROCEDURE — 84484 ASSAY OF TROPONIN QUANT: CPT | Performed by: STUDENT IN AN ORGANIZED HEALTH CARE EDUCATION/TRAINING PROGRAM

## 2023-03-06 PROCEDURE — 80061 LIPID PANEL: CPT | Performed by: STUDENT IN AN ORGANIZED HEALTH CARE EDUCATION/TRAINING PROGRAM

## 2023-03-06 PROCEDURE — 99396 PREV VISIT EST AGE 40-64: CPT | Performed by: STUDENT IN AN ORGANIZED HEALTH CARE EDUCATION/TRAINING PROGRAM

## 2023-03-06 PROCEDURE — 85027 COMPLETE CBC AUTOMATED: CPT | Performed by: STUDENT IN AN ORGANIZED HEALTH CARE EDUCATION/TRAINING PROGRAM

## 2023-03-06 PROCEDURE — 80053 COMPREHEN METABOLIC PANEL: CPT | Performed by: STUDENT IN AN ORGANIZED HEALTH CARE EDUCATION/TRAINING PROGRAM

## 2023-03-06 RX ORDER — BETAMETHASONE DIPROPIONATE 0.5 MG/G
CREAM TOPICAL 2 TIMES DAILY
Qty: 45 G | Refills: 0 | Status: SHIPPED | OUTPATIENT
Start: 2023-03-06

## 2023-03-06 RX ORDER — FLUTICASONE PROPIONATE 0.05 %
CREAM (GRAM) TOPICAL 2 TIMES DAILY
Qty: 60 G | Refills: 0 | Status: SHIPPED | OUTPATIENT
Start: 2023-03-06

## 2023-03-06 RX ORDER — LORAZEPAM 0.5 MG/1
TABLET ORAL
Qty: 2 TABLET | Refills: 0 | Status: SHIPPED | OUTPATIENT
Start: 2023-03-06

## 2023-03-06 RX ORDER — FLUOROURACIL 50 MG/G
CREAM TOPICAL 2 TIMES DAILY
Qty: 40 G | Refills: 1 | Status: SHIPPED | OUTPATIENT
Start: 2023-03-06

## 2023-03-06 RX ORDER — FLUTICASONE PROPIONATE 0.05 %
CREAM (GRAM) TOPICAL 2 TIMES DAILY
COMMUNITY
End: 2023-03-06

## 2023-03-06 ASSESSMENT — ENCOUNTER SYMPTOMS
HEMATOCHEZIA: 0
HEMATURIA: 0
NAUSEA: 0
JOINT SWELLING: 0
EYE PAIN: 0
ABDOMINAL PAIN: 0
PARESTHESIAS: 0
FREQUENCY: 0
NERVOUS/ANXIOUS: 1
CHILLS: 0
COUGH: 0
HEADACHES: 0
DIARRHEA: 0
DIZZINESS: 1
SHORTNESS OF BREATH: 0
PALPITATIONS: 0
DYSURIA: 0
FEVER: 0
CONSTIPATION: 0
WEAKNESS: 0
MYALGIAS: 0
HEARTBURN: 1
SORE THROAT: 0
ARTHRALGIAS: 0

## 2023-03-06 ASSESSMENT — PAIN SCALES - GENERAL: PAINLEVEL: NO PAIN (0)

## 2023-03-08 NOTE — RESULT ENCOUNTER NOTE
Jessica,  Your results from your recent clinic visit show:  Your CMP was normal with normal electrolytes, kidney function, and liver function  Your lipids look ok and I used these as well as other factors from your history to calculate your 10 year risk of having something like a heart attack (ASCVD risk) and it was low risk. Just continue to work on exercise and diet to maintain this low risk.  Your CBC is normal with no anemia or signs of infection seen  Your troponin was normal  I will await your imaging results and call you when they are back    If you have more questions please call the clinic at [phone] or send me a [employer] message    Dr. Jerome Rivera

## 2024-01-22 ENCOUNTER — NURSE TRIAGE (OUTPATIENT)
Dept: NURSING | Facility: CLINIC | Age: 54
End: 2024-01-22
Payer: COMMERCIAL

## 2024-01-22 DIAGNOSIS — J34.89 NASAL SEPTAL PERFORATION: Primary | ICD-10-CM

## 2024-01-22 NOTE — TELEPHONE ENCOUNTER
Patient is returning call and stating that her insurance will cover [facility] in [place], [place]. Please advise and call patient back with updates please and thank you.

## 2024-01-22 NOTE — TELEPHONE ENCOUNTER
"S-(situation): hole in nasal septum     B-(background):   Pt noticed that there is a hole in her nasal septum. Pt has a sore in her right nostril, treated with a cream prescribed by derm. She was sticking a q-tip up her right nostril yesterday and noticed hole in septum.    A-(assessment):   Mild discomfort in her nose  Unable to smell on her left nostril, pt also states she breathes better on her right nostril than her left.    No swelling  No SOB  No nose bleeds  No fever    R-(recommendations):   [facility] recommends ENT referral.   Pt will try to find out if [facility] or [facility] [place] are in network.    Call back if fever, more pain, facial swelling develops.    Pt verbalized understanding.    Simona Schwab RN/[facility] Nurse Advisor              Reason for Disposition   MILD pain that is constant and present > 24 hours    Additional Information   Negative: Shock suspected (e.g., cold/pale/clammy skin, too weak to stand, low BP, rapid pulse)   Negative: Similar pain previously and it was from 'heart attack'   Negative: Similar pain previously and it was from 'angina' and not relieved by nitroglycerin   Negative: Sounds like a life-threatening emergency to the triager   Negative: Chest pain   Negative: Sinus pain and congestion   Negative: Headache or pain in upper forehead   Negative: Toothache is main symptom   Negative: Followed a face injury   Negative: Difficulty breathing or unusual sweating (e.g., sweating without exertion)   Negative: Can't close the mouth fully (i.e., \"mouth is locked open\", patient will have difficulty talking)   Negative: Fever and localized red rash   Negative: Fever and area of face is swollen   Negative: New-onset jaw pain of unknown cause AND at least one cardiac risk factor (e.g., 45 years or older, diabetes, high cholesterol, hypertension, obesity, smoker or strong family history of heart disease)   Negative: Patient sounds very sick or weak to the triager   Negative: SEVERE " pain (e.g., excruciating, unable to do any normal activities)   Negative: Localized red rash and painful to the touch   Negative: Painful rash with multiple small blisters grouped together (i.e., dermatomal distribution or 'band' or 'stripe')   Negative: Swollen area of face and toothache   Negative: Swollen area of face that is painful to touch   Negative: Swelling around the eye   Negative: MODERATE pain that is constant and present > 24 hours   Negative: Fever present > 3 days (72 hours)   Negative: Looks like a boil or infected sore   Negative: Patient wants to be seen    Protocols used: Face Pain-A-[place]

## 2024-01-22 NOTE — TELEPHONE ENCOUNTER
Provider Recommendation Follow Up:   Reached patient/caregiver. Informed of provider's recommendations. Patient verbalized understanding and agrees with the plan.     Provided pt with phone number [facility] to schedule appt.    Called [facility] and pt is not in their system yet so needs referral faxed to them at [phone]    RONI RiceN, RN  [facility]

## 2024-01-22 NOTE — TELEPHONE ENCOUNTER
Referral pended if appropriate and routed to PCP.    If order signed please ensure it is faxed to [facility] in [place].    CORNELIUS Sullivan, RN  [facility]

## 2024-02-05 ENCOUNTER — PATIENT OUTREACH (OUTPATIENT)
Dept: CARE COORDINATION | Facility: CLINIC | Age: 54
End: 2024-02-05
Payer: COMMERCIAL

## 2024-02-19 ENCOUNTER — PATIENT OUTREACH (OUTPATIENT)
Dept: CARE COORDINATION | Facility: CLINIC | Age: 54
End: 2024-02-19
Payer: COMMERCIAL

## 2024-06-08 ENCOUNTER — HEALTH MAINTENANCE LETTER (OUTPATIENT)
Age: 54
End: 2024-06-08